# Patient Record
Sex: FEMALE | Race: WHITE | NOT HISPANIC OR LATINO
[De-identification: names, ages, dates, MRNs, and addresses within clinical notes are randomized per-mention and may not be internally consistent; named-entity substitution may affect disease eponyms.]

---

## 2017-02-02 ENCOUNTER — RECORD ABSTRACTING (OUTPATIENT)
Age: 62
End: 2017-02-02

## 2017-02-02 DIAGNOSIS — Z87.39 PERSONAL HISTORY OF OTHER DISEASES OF THE MUSCULOSKELETAL SYSTEM AND CONNECTIVE TISSUE: ICD-10-CM

## 2017-02-02 DIAGNOSIS — M17.0 BILATERAL PRIMARY OSTEOARTHRITIS OF KNEE: ICD-10-CM

## 2017-02-02 DIAGNOSIS — Q74.2 OTHER CONGENITAL MALFORMATIONS OF LOWER LIMB(S), INCLUDING PELVIC GIRDLE: ICD-10-CM

## 2017-02-02 DIAGNOSIS — M25.561 PAIN IN RIGHT KNEE: ICD-10-CM

## 2017-02-02 DIAGNOSIS — M25.562 PAIN IN LEFT KNEE: ICD-10-CM

## 2017-02-02 DIAGNOSIS — Z82.61 FAMILY HISTORY OF ARTHRITIS: ICD-10-CM

## 2017-02-02 DIAGNOSIS — Z80.9 FAMILY HISTORY OF MALIGNANT NEOPLASM, UNSPECIFIED: ICD-10-CM

## 2017-02-02 RX ORDER — CHROMIUM 200 MCG
TABLET ORAL
Refills: 0 | Status: ACTIVE | COMMUNITY

## 2017-08-15 ENCOUNTER — APPOINTMENT (OUTPATIENT)
Dept: ORTHOPEDIC SURGERY | Facility: CLINIC | Age: 62
End: 2017-08-15
Payer: COMMERCIAL

## 2017-08-15 VITALS
SYSTOLIC BLOOD PRESSURE: 100 MMHG | RESPIRATION RATE: 98 BRPM | DIASTOLIC BLOOD PRESSURE: 60 MMHG | HEIGHT: 63 IN | HEART RATE: 75 BPM | WEIGHT: 145 LBS | BODY MASS INDEX: 25.69 KG/M2

## 2017-08-15 DIAGNOSIS — R77.0 ABNORMALITY OF ALBUMIN: ICD-10-CM

## 2017-08-15 PROCEDURE — 99214 OFFICE O/P EST MOD 30 MIN: CPT

## 2017-08-15 RX ORDER — MELOXICAM 7.5 MG/1
7.5 TABLET ORAL
Refills: 0 | Status: DISCONTINUED | COMMUNITY
End: 2017-08-15

## 2018-08-05 ENCOUNTER — FORM ENCOUNTER (OUTPATIENT)
Age: 63
End: 2018-08-05

## 2018-08-06 ENCOUNTER — APPOINTMENT (OUTPATIENT)
Dept: RADIOLOGY | Facility: CLINIC | Age: 63
End: 2018-08-06
Payer: COMMERCIAL

## 2018-08-06 ENCOUNTER — OUTPATIENT (OUTPATIENT)
Dept: OUTPATIENT SERVICES | Facility: HOSPITAL | Age: 63
LOS: 1 days | End: 2018-08-06
Payer: COMMERCIAL

## 2018-08-06 PROCEDURE — 77080 DXA BONE DENSITY AXIAL: CPT

## 2018-08-06 PROCEDURE — 77080 DXA BONE DENSITY AXIAL: CPT | Mod: 26

## 2018-09-20 ENCOUNTER — APPOINTMENT (OUTPATIENT)
Dept: ORTHOPEDIC SURGERY | Facility: CLINIC | Age: 63
End: 2018-09-20
Payer: COMMERCIAL

## 2018-09-20 VITALS
BODY MASS INDEX: 25.98 KG/M2 | OXYGEN SATURATION: 96 % | WEIGHT: 143 LBS | SYSTOLIC BLOOD PRESSURE: 110 MMHG | HEIGHT: 62.2 IN | DIASTOLIC BLOOD PRESSURE: 60 MMHG | HEART RATE: 60 BPM

## 2018-09-20 VITALS — BODY MASS INDEX: 25.49 KG/M2 | HEIGHT: 62.8 IN

## 2018-09-20 PROCEDURE — 99214 OFFICE O/P EST MOD 30 MIN: CPT

## 2019-07-14 ENCOUNTER — FORM ENCOUNTER (OUTPATIENT)
Age: 64
End: 2019-07-14

## 2019-07-15 ENCOUNTER — OUTPATIENT (OUTPATIENT)
Dept: OUTPATIENT SERVICES | Facility: HOSPITAL | Age: 64
LOS: 1 days | End: 2019-07-15

## 2019-07-15 ENCOUNTER — APPOINTMENT (OUTPATIENT)
Dept: RADIOLOGY | Facility: CLINIC | Age: 64
End: 2019-07-15
Payer: MEDICARE

## 2019-07-15 PROCEDURE — 77080 DXA BONE DENSITY AXIAL: CPT | Mod: 26

## 2019-08-07 ENCOUNTER — APPOINTMENT (OUTPATIENT)
Dept: ORTHOPEDIC SURGERY | Facility: CLINIC | Age: 64
End: 2019-08-07
Payer: MEDICARE

## 2019-08-07 VITALS
BODY MASS INDEX: 24.4 KG/M2 | OXYGEN SATURATION: 96 % | SYSTOLIC BLOOD PRESSURE: 104 MMHG | WEIGHT: 136 LBS | HEART RATE: 71 BPM | DIASTOLIC BLOOD PRESSURE: 60 MMHG | HEIGHT: 62.6 IN

## 2019-08-07 DIAGNOSIS — M81.0 AGE-RELATED OSTEOPOROSIS W/OUT CURRENT PATHOLOGICAL FRACTURE: ICD-10-CM

## 2019-08-07 PROCEDURE — 99214 OFFICE O/P EST MOD 30 MIN: CPT

## 2019-08-07 RX ORDER — ALENDRONATE SODIUM 70 MG/1
70 TABLET ORAL
Qty: 13 | Refills: 3 | Status: ACTIVE | COMMUNITY
Start: 2017-08-15 | End: 1900-01-01

## 2019-08-07 NOTE — HISTORY OF PRESENT ILLNESS
[FreeTextEntry1] : This is a 1 year f/u visit  for this 64  year old \par Menopause 52-53.\par There are no new medical problems since last visit.  \par Has arthritis of knee. PT and exercise have helped\par The patient's osteoporosis medication history is as follows:\par \par [id number]  (49-52)         Actonel                     3 years\par [id number]   (52-53)        Holiday                     1 year\par [id number]    (53-56)       Actonel                     3 years\par -2015               Evista                        5 years\par 2015-2016               Evista, intermittent\par 2016-2017              Evista\par -                        alendronate 35 mg     1 year\par -                   alendronate 70 mg     1 year\par \par The patient had tolerated Evista well, no side effects and no contraindications. \par She has tolerated ALN as well\par \par Today,updated DXA done 7/15/19 was rev and compared with: 18,  16 and 14. \par T-scores: -3.1, -2.2, -1.6 and -0.8 at the lS, L FN, L TH and L 1/3 radius\par T-scores: -2.8, -2.2, -1.5, -1.1 at the LS, L FN, L TH and L 1/3 radius\par T-scores: -2.7, -1.8, -1.5, and -1.1 at the spine, L FN, L TH, and L 1/3 radius\par  T-scores were -3.1, -1.8 and -1.3 at the lumbar spine, femoral neck and total hip, respectively.This represents relative stability at the hip with slight decline at the spine. \par \par Today, labs from 19 were rev and compared with: labs from 18 and 16\par NTX:25, prior 37, 59, 72, 67, prior 54, prior 47 (was 41), \par BSAP: 8.3, prior 11.3, 12.5, prior 13.3, had been 16.2 (8.1-32). \par Vitamin D:28, prior  38, 2, 34.1, 31.4, prior 28.1, had been 30.5, \par PTH/ca: 54/9.1  alb: 3.9;  47/9.4, 36/8.9 (corrected 9.3)  alb: 3.5;  38/8.9  albumin: 3.7\par alb: 3.9, prior 3.7\par gluc: 77,  88, 91\par creat:0.51,  0.61, 0.57, 0.54\par urine microalb: (2017) 4.2 (low, normal)\par \par chol:  156, 157, 151\par HDL:81,  89, 84,\par LDL: 66, 60. 62\par T, 42, 42\par other labs including lipid profile normal.  \par \par On PE, height is stable at 5'3", weight 141.  There is mild scoliosis.\par \par Medication:\par stopped Evista\par \par calcium intake: calcium citrate 2 in AM\par vitamin D: advise 2000 IU/day, has been on 3000/day

## 2019-08-07 NOTE — ASSESSMENT
[FreeTextEntry1] : 64 year old otherwise healthy woman who has osteoporosis by DXA,  past history of foot fractures, no recent fractures, stable height, and 6 cumulative years of bisphosphonate, with 6 years of Evista. Bone markers suggested that Evista was not effectively suppressing resorption therefore alendronate was resumed August 2017 at dose of 35 mg/week.  A main reason I chose to continue treatment is the patient's mild scoliosis in an attempt to prevent worsening. In 2018 we increased Aln to 70 mg/week. Patient's BMD is stable at hip with reported decline at spine, but bone markers are more suppressed, suggesting that at least biochemically the patient is responding.\par We will continue one more year of ALN which pt is tolerating well. F/u can actually be at age 68, 3 years after completing her 3rd year of ALN.\par \par Plan:\par 1. continue calcium, 2 pills in AM; and vitamin D 2000 IU/day\par 2. continue full strength alendronate, 70 mg/week, instructions rev to complete 3 years of Rx\par 3 .labs ordered for one year, August 2020 if pt wishes, or, in 3 years after completing ALN; f/u prn\par

## 2019-12-03 ENCOUNTER — OUTPATIENT (OUTPATIENT)
Dept: OUTPATIENT SERVICES | Facility: HOSPITAL | Age: 64
LOS: 1 days | End: 2019-12-03

## 2019-12-03 ENCOUNTER — APPOINTMENT (OUTPATIENT)
Dept: RADIOLOGY | Facility: CLINIC | Age: 64
End: 2019-12-03

## 2019-12-03 ENCOUNTER — APPOINTMENT (OUTPATIENT)
Dept: MAMMOGRAPHY | Facility: CLINIC | Age: 64
End: 2019-12-03
Payer: MEDICARE

## 2019-12-03 ENCOUNTER — APPOINTMENT (OUTPATIENT)
Dept: ULTRASOUND IMAGING | Facility: CLINIC | Age: 64
End: 2019-12-03
Payer: MEDICARE

## 2019-12-03 PROCEDURE — 77067 SCR MAMMO BI INCL CAD: CPT | Mod: 26

## 2019-12-03 PROCEDURE — 73564 X-RAY EXAM KNEE 4 OR MORE: CPT | Mod: 26,50

## 2019-12-03 PROCEDURE — 76641 ULTRASOUND BREAST COMPLETE: CPT | Mod: 26,50

## 2019-12-03 PROCEDURE — 72190 X-RAY EXAM OF PELVIS: CPT | Mod: 26

## 2019-12-03 PROCEDURE — 77063 BREAST TOMOSYNTHESIS BI: CPT | Mod: 26

## 2021-02-25 ENCOUNTER — APPOINTMENT (OUTPATIENT)
Dept: ULTRASOUND IMAGING | Facility: CLINIC | Age: 66
End: 2021-02-25
Payer: MEDICARE

## 2021-02-25 ENCOUNTER — APPOINTMENT (OUTPATIENT)
Dept: MAMMOGRAPHY | Facility: CLINIC | Age: 66
End: 2021-02-25
Payer: MEDICARE

## 2021-02-25 ENCOUNTER — APPOINTMENT (OUTPATIENT)
Dept: RADIOLOGY | Facility: CLINIC | Age: 66
End: 2021-02-25
Payer: MEDICARE

## 2021-02-25 ENCOUNTER — OUTPATIENT (OUTPATIENT)
Dept: OUTPATIENT SERVICES | Facility: HOSPITAL | Age: 66
LOS: 1 days | End: 2021-02-25

## 2021-02-25 PROCEDURE — 76641 ULTRASOUND BREAST COMPLETE: CPT | Mod: 26,50

## 2021-02-25 PROCEDURE — 77080 DXA BONE DENSITY AXIAL: CPT | Mod: 26

## 2021-02-25 PROCEDURE — 77063 BREAST TOMOSYNTHESIS BI: CPT | Mod: 26

## 2021-02-25 PROCEDURE — 77067 SCR MAMMO BI INCL CAD: CPT | Mod: 26

## 2021-02-25 PROCEDURE — 76536 US EXAM OF HEAD AND NECK: CPT | Mod: 26

## 2022-09-30 ENCOUNTER — APPOINTMENT (OUTPATIENT)
Dept: ORTHOPEDIC SURGERY | Facility: CLINIC | Age: 67
End: 2022-09-30

## 2022-09-30 ENCOUNTER — TRANSCRIPTION ENCOUNTER (OUTPATIENT)
Age: 67
End: 2022-09-30

## 2022-09-30 VITALS — WEIGHT: 138.5 LBS | RESPIRATION RATE: 16 BRPM | BODY MASS INDEX: 24.54 KG/M2 | HEIGHT: 63 IN

## 2022-09-30 DIAGNOSIS — M65.311 TRIGGER THUMB, RIGHT THUMB: ICD-10-CM

## 2022-09-30 DIAGNOSIS — M19.031 PRIMARY OSTEOARTHRITIS, RIGHT WRIST: ICD-10-CM

## 2022-09-30 PROCEDURE — 99203 OFFICE O/P NEW LOW 30 MIN: CPT
